# Patient Record
Sex: MALE | Race: WHITE | NOT HISPANIC OR LATINO | Employment: UNEMPLOYED | URBAN - METROPOLITAN AREA
[De-identification: names, ages, dates, MRNs, and addresses within clinical notes are randomized per-mention and may not be internally consistent; named-entity substitution may affect disease eponyms.]

---

## 2018-06-28 ENCOUNTER — HOSPITAL ENCOUNTER (EMERGENCY)
Facility: HOSPITAL | Age: 58
Discharge: HOME | End: 2018-06-28
Attending: EMERGENCY MEDICINE
Payer: MEDICAID

## 2018-06-28 VITALS
RESPIRATION RATE: 18 BRPM | TEMPERATURE: 99 F | DIASTOLIC BLOOD PRESSURE: 60 MMHG | HEIGHT: 71 IN | BODY MASS INDEX: 29.4 KG/M2 | WEIGHT: 210 LBS | OXYGEN SATURATION: 100 % | SYSTOLIC BLOOD PRESSURE: 110 MMHG | HEART RATE: 67 BPM

## 2018-06-28 DIAGNOSIS — M54.50 CHRONIC BILATERAL LOW BACK PAIN WITHOUT SCIATICA: Primary | ICD-10-CM

## 2018-06-28 DIAGNOSIS — F10.20 ALCOHOLISM: ICD-10-CM

## 2018-06-28 DIAGNOSIS — G89.29 CHRONIC BILATERAL LOW BACK PAIN WITHOUT SCIATICA: Primary | ICD-10-CM

## 2018-06-28 PROCEDURE — 99284 EMERGENCY DEPT VISIT MOD MDM: CPT | Mod: 25

## 2018-06-28 PROCEDURE — 63600175 PHARM REV CODE 636 W HCPCS: Performed by: EMERGENCY MEDICINE

## 2018-06-28 PROCEDURE — 96372 THER/PROPH/DIAG INJ SC/IM: CPT

## 2018-06-28 RX ORDER — DEXAMETHASONE SODIUM PHOSPHATE 4 MG/ML
8 INJECTION, SOLUTION INTRA-ARTICULAR; INTRALESIONAL; INTRAMUSCULAR; INTRAVENOUS; SOFT TISSUE
Status: COMPLETED | OUTPATIENT
Start: 2018-06-28 | End: 2018-06-28

## 2018-06-28 RX ORDER — KETOROLAC TROMETHAMINE 30 MG/ML
15 INJECTION, SOLUTION INTRAMUSCULAR; INTRAVENOUS
Status: COMPLETED | OUTPATIENT
Start: 2018-06-28 | End: 2018-06-28

## 2018-06-28 RX ADMIN — DEXAMETHASONE SODIUM PHOSPHATE 8 MG: 4 INJECTION, SOLUTION INTRAMUSCULAR; INTRAVENOUS at 06:06

## 2018-06-28 RX ADMIN — KETOROLAC TROMETHAMINE 15 MG: 30 INJECTION, SOLUTION INTRAMUSCULAR at 06:06

## 2018-06-28 NOTE — ED PROVIDER NOTES
Encounter Date: 6/28/2018       History     Chief Complaint   Patient presents with    Back Pain     chromic back pain      57-year-old male with a past medical history of chronic back pain from degenerative disc disease with multiple bulging disc according to pain management specialist who saw him several months ago and discontinued his chronic Norco for which he took approximately 20 years, depression, schizophrenia, diabetes who presents the emergency department for evaluation of low back pain that is been present for the past many years, but increased since he is now off is Norco for the past month.  The patient drinks daily because he is not able to quell the pain in up to sleep at night.  He does work at [employer].  He denies any unilateral focal weakness or numbness but has any pain with lifting.  He has no fevers or history of infectious process.  Denies any dysuria abdominal pain nausea vomiting diarrhea chest pain or shortness of breath.  The pain is located in the lower back.  He has no sick history of lumbar surgery.  He missed work today admits to drinking last night          Review of patient's allergies indicates:  No Known Allergies  Past Medical History:   Diagnosis Date    Depression     Diabetes mellitus     History of psychiatric care     Hypertension     Psychiatric problem     Schizoaffective disorder      Past Surgical History:   Procedure Laterality Date    APPENDECTOMY       Family History   Problem Relation Age of Onset    Depression Mother     Alcohol abuse Father      Social History   Substance Use Topics    Smoking status: Current Every Day Smoker     Packs/day: 1.00     Years: 15.00    Smokeless tobacco: Never Used    Alcohol use Yes      Comment: drinks fifth of vodka 4 days PTA     Review of Systems   Constitutional: Negative for fever.   HENT: Negative for ear pain, rhinorrhea and sore throat.    Eyes: Negative for pain and visual disturbance.    Respiratory: Negative for cough and shortness of breath.    Cardiovascular: Negative for chest pain.   Gastrointestinal: Negative for abdominal pain, diarrhea, nausea and vomiting.   Genitourinary: Negative for difficulty urinating.   Musculoskeletal: Positive for back pain. Negative for arthralgias.   Skin: Negative for rash.   Neurological: Negative for dizziness, weakness, numbness and headaches.   All other systems reviewed and are negative.      Physical Exam     Initial Vitals [06/28/18 0541]   BP Pulse Resp Temp SpO2   103/60 67 18 98.7 °F (37.1 °C) 95 %      MAP       --         Physical Exam    Nursing note and vitals reviewed.  Constitutional: He appears well-developed and well-nourished. No distress.   HENT:   Head: Normocephalic and atraumatic.   Mouth/Throat: Oropharynx is clear and moist.   Eyes: Conjunctivae and EOM are normal. Pupils are equal, round, and reactive to light.   Neck: Normal range of motion. Neck supple.   Cardiovascular: Normal rate, regular rhythm, normal heart sounds and intact distal pulses. Exam reveals no gallop and no friction rub.    No murmur heard.  Pulmonary/Chest: Breath sounds normal. He has no wheezes. He has no rales.   Abdominal: Soft. Bowel sounds are normal. There is no tenderness. There is no rebound and no guarding.   Musculoskeletal: Normal range of motion.   Able to sit up from a lying position under his own strength.    Mild bilateral lower lumbar tenderness.   Neurological: He is alert and oriented to person, place, and time. He has normal strength. No sensory deficit.   Skin: Skin is warm and dry.   Psychiatric: He has a normal mood and affect.   Smells mildy of etoh           ED Course   Procedures  Labs Reviewed - No data to display       Imaging Results    None          Medical Decision Making:   Patient is a chronic alcoholic with a history of chronic back pain who no longer is able to get opiates and is having chronic exacerbation of his back pain. I do  not feel that opiates are warranted here.  I will give him a dose of toradol and a dose of steroids.  Patient states this has worked for him in the past .  He denies any recent trauma and there are no focal neurologic deficits or significant midline tenderness to warrant emergent imaging at this time.  I do believe the patient is stable for discharge at this time.  I have given him very specific return precautions.  Overall impression is chronic back pain, alcoholism, degenerative disc disease                      Clinical Impression:   The primary encounter diagnosis was Chronic bilateral low back pain without sciatica. A diagnosis of Alcoholism was also pertinent to this visit.                             Bc Sanchez MD  06/28/18 0620

## 2018-06-28 NOTE — ED NOTES
Pt alert and oriented. Pt in NAD. D/c instructions given. Pt discharged to  to wait for ride home.

## 2018-08-10 ENCOUNTER — HOSPITAL ENCOUNTER (EMERGENCY)
Facility: HOSPITAL | Age: 58
Discharge: ANOTHER HOSPITAL | End: 2018-08-10
Attending: EMERGENCY MEDICINE
Payer: MEDICAID

## 2018-08-10 VITALS
SYSTOLIC BLOOD PRESSURE: 185 MMHG | WEIGHT: 210 LBS | TEMPERATURE: 99 F | DIASTOLIC BLOOD PRESSURE: 99 MMHG | RESPIRATION RATE: 16 BRPM | BODY MASS INDEX: 29.4 KG/M2 | HEIGHT: 71 IN | HEART RATE: 97 BPM | OXYGEN SATURATION: 97 %

## 2018-08-10 DIAGNOSIS — S32.475A: Primary | ICD-10-CM
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
## 2018-08-10 DIAGNOSIS — E87.1 HYPONATREMIA: ICD-10-CM

## 2018-08-10 DIAGNOSIS — S72.142A CLOSED DISPLACED INTERTROCHANTERIC FRACTURE OF LEFT FEMUR, INITIAL ENCOUNTER: ICD-10-CM

## 2018-08-10 DIAGNOSIS — F10.929 ALCOHOLIC INTOXICATION WITH COMPLICATION: ICD-10-CM

## 2018-08-10 DIAGNOSIS — S32.511A CLOSED FRACTURE OF RIGHT SUPERIOR PUBIC RAMUS, INITIAL ENCOUNTER: ICD-10-CM

## 2018-08-10 LAB
ALBUMIN SERPL BCP-MCNC: 3.6 G/DL
ALP SERPL-CCNC: 84 U/L
ALT SERPL W/O P-5'-P-CCNC: 16 U/L
AMPHET+METHAMPHET UR QL: NEGATIVE
ANION GAP SERPL CALC-SCNC: 14 MMOL/L
APTT BLDCRRT: 30 SEC
AST SERPL-CCNC: 17 U/L
BARBITURATES UR QL SCN>200 NG/ML: NEGATIVE
BASOPHILS # BLD AUTO: 0.01 K/UL
BASOPHILS NFR BLD: 0.1 %
BENZODIAZ UR QL SCN>200 NG/ML: NORMAL
BILIRUB SERPL-MCNC: 0.6 MG/DL
BILIRUB UR QL STRIP: NEGATIVE
BUN SERPL-MCNC: 20 MG/DL
BZE UR QL SCN: NEGATIVE
CALCIUM SERPL-MCNC: 9.9 MG/DL
CANNABINOIDS UR QL SCN: NEGATIVE
CHLORIDE SERPL-SCNC: 91 MMOL/L
CLARITY UR: CLEAR
CO2 SERPL-SCNC: 21 MMOL/L
COLOR UR: YELLOW
CREAT SERPL-MCNC: 1.2 MG/DL
CREAT UR-MCNC: 59 MG/DL
DIFFERENTIAL METHOD: ABNORMAL
EOSINOPHIL # BLD AUTO: 0 K/UL
EOSINOPHIL NFR BLD: 0.2 %
ERYTHROCYTE [DISTWIDTH] IN BLOOD BY AUTOMATED COUNT: 14.3 %
EST. GFR  (AFRICAN AMERICAN): >60 ML/MIN/1.73 M^2
EST. GFR  (NON AFRICAN AMERICAN): >60 ML/MIN/1.73 M^2
ETHANOL SERPL-MCNC: 54 MG/DL
GLUCOSE SERPL-MCNC: 115 MG/DL
GLUCOSE UR QL STRIP: NEGATIVE
HCT VFR BLD AUTO: 32.6 %
HGB BLD-MCNC: 11.4 G/DL
HGB UR QL STRIP: NEGATIVE
INR PPP: 1
KETONES UR QL STRIP: NEGATIVE
LEUKOCYTE ESTERASE UR QL STRIP: NEGATIVE
LYMPHOCYTES # BLD AUTO: 0.5 K/UL
LYMPHOCYTES NFR BLD: 6.3 %
MCH RBC QN AUTO: 30.5 PG
MCHC RBC AUTO-ENTMCNC: 35 G/DL
MCV RBC AUTO: 87 FL
METHADONE UR QL SCN>300 NG/ML: NEGATIVE
MONOCYTES # BLD AUTO: 0.4 K/UL
MONOCYTES NFR BLD: 5.2 %
NEUTROPHILS # BLD AUTO: 7.4 K/UL
NEUTROPHILS NFR BLD: 88.1 %
NITRITE UR QL STRIP: NEGATIVE
OPIATES UR QL SCN: NORMAL
PCP UR QL SCN>25 NG/ML: NEGATIVE
PH UR STRIP: 7 [PH] (ref 5–8)
PLATELET # BLD AUTO: 144 K/UL
PMV BLD AUTO: 9.4 FL
POTASSIUM SERPL-SCNC: 3.9 MMOL/L
PROT SERPL-MCNC: 6.3 G/DL
PROT UR QL STRIP: NEGATIVE
PROTHROMBIN TIME: 10.5 SEC
RBC # BLD AUTO: 3.74 M/UL
SODIUM SERPL-SCNC: 126 MMOL/L
SP GR UR STRIP: <=1.005 (ref 1–1.03)
TOXICOLOGY INFORMATION: NORMAL
URN SPEC COLLECT METH UR: ABNORMAL
UROBILINOGEN UR STRIP-ACNC: NEGATIVE EU/DL
WBC # BLD AUTO: 8.44 K/UL

## 2018-08-10 PROCEDURE — 63600175 PHARM REV CODE 636 W HCPCS: Performed by: EMERGENCY MEDICINE

## 2018-08-10 PROCEDURE — 85025 COMPLETE CBC W/AUTO DIFF WBC: CPT

## 2018-08-10 PROCEDURE — 80307 DRUG TEST PRSMV CHEM ANLYZR: CPT

## 2018-08-10 PROCEDURE — 96374 THER/PROPH/DIAG INJ IV PUSH: CPT

## 2018-08-10 PROCEDURE — 99285 EMERGENCY DEPT VISIT HI MDM: CPT | Mod: 25

## 2018-08-10 PROCEDURE — 81003 URINALYSIS AUTO W/O SCOPE: CPT | Mod: 59

## 2018-08-10 PROCEDURE — 85730 THROMBOPLASTIN TIME PARTIAL: CPT

## 2018-08-10 PROCEDURE — 96376 TX/PRO/DX INJ SAME DRUG ADON: CPT

## 2018-08-10 PROCEDURE — 96375 TX/PRO/DX INJ NEW DRUG ADDON: CPT

## 2018-08-10 PROCEDURE — 25000003 PHARM REV CODE 250: Performed by: EMERGENCY MEDICINE

## 2018-08-10 PROCEDURE — 85610 PROTHROMBIN TIME: CPT

## 2018-08-10 PROCEDURE — 80320 DRUG SCREEN QUANTALCOHOLS: CPT

## 2018-08-10 PROCEDURE — 80053 COMPREHEN METABOLIC PANEL: CPT

## 2018-08-10 RX ORDER — ONDANSETRON 2 MG/ML
4 INJECTION INTRAMUSCULAR; INTRAVENOUS
Status: COMPLETED | OUTPATIENT
Start: 2018-08-10 | End: 2018-08-10

## 2018-08-10 RX ORDER — FENTANYL CITRATE 50 UG/ML
100 INJECTION, SOLUTION INTRAMUSCULAR; INTRAVENOUS
Status: COMPLETED | OUTPATIENT
Start: 2018-08-10 | End: 2018-08-10

## 2018-08-10 RX ORDER — MORPHINE SULFATE 4 MG/ML
4 INJECTION, SOLUTION INTRAMUSCULAR; INTRAVENOUS
Status: COMPLETED | OUTPATIENT
Start: 2018-08-10 | End: 2018-08-10

## 2018-08-10 RX ORDER — SODIUM CHLORIDE 9 MG/ML
1000 INJECTION, SOLUTION INTRAVENOUS
Status: COMPLETED | OUTPATIENT
Start: 2018-08-10 | End: 2018-08-10

## 2018-08-10 RX ADMIN — ONDANSETRON 4 MG: 2 INJECTION INTRAMUSCULAR; INTRAVENOUS at 02:08

## 2018-08-10 RX ADMIN — MORPHINE SULFATE 4 MG: 4 INJECTION INTRAVENOUS at 06:08

## 2018-08-10 RX ADMIN — ONDANSETRON 4 MG: 2 INJECTION INTRAMUSCULAR; INTRAVENOUS at 06:08

## 2018-08-10 RX ADMIN — FENTANYL CITRATE 100 MCG: 50 INJECTION, SOLUTION INTRAMUSCULAR; INTRAVENOUS at 02:08

## 2018-08-10 RX ADMIN — SODIUM CHLORIDE 1000 ML: 0.9 INJECTION, SOLUTION INTRAVENOUS at 02:08

## 2018-08-10 NOTE — ED PROVIDER NOTES
Encounter Date: 8/10/2018       History     Chief Complaint   Patient presents with    Fall     Patient presents to the ED via [facility] with reports of patient with a fall from a standing position and now having left hip pain with shortening and rotation. EMS states +ETOH.      Patient is a 57-year-old male brought in by EMS after he sustained a ground level fall.  Patient says he gets frequent dizzy spells which caused him to fall.  He says he landed on his left side and now has severe left hip pain worsened by any attempted movement of the left leg.  He denies any head trauma or loss of consciousness.  No nausea or vomiting. No visual changes.  Patient admits to ETOH consumption.          Review of patient's allergies indicates:  No Known Allergies  Past Medical History:   Diagnosis Date    Depression     Diabetes mellitus     History of psychiatric care     Hypertension     Psychiatric problem     Schizoaffective disorder      Past Surgical History:   Procedure Laterality Date    APPENDECTOMY       Family History   Problem Relation Age of Onset    Depression Mother     Alcohol abuse Father      Social History   Substance Use Topics    Smoking status: Current Every Day Smoker     Packs/day: 1.00     Years: 15.00    Smokeless tobacco: Never Used    Alcohol use Yes      Comment: drinks fifth of vodka 4 days PTA     Review of Systems   Respiratory: Negative for shortness of breath.    Cardiovascular: Negative for chest pain.   Gastrointestinal: Negative for abdominal pain, nausea and vomiting.   Musculoskeletal: Positive for back pain and neck pain.        Left hip pain.   Neurological: Positive for dizziness. Negative for syncope.   All other systems reviewed and are negative.      Physical Exam     Initial Vitals [08/10/18 0029]   BP Pulse Resp Temp SpO2   115/75 97 18 97.9 °F (36.6 °C) 100 %      MAP       --         Physical Exam    Nursing note and vitals reviewed.  Constitutional: No  distress.   HENT:   Head: Normocephalic and atraumatic.   Eyes: EOM are normal. Pupils are equal, round, and reactive to light.   Neck: Neck supple.   Mild upper cervical vertebral tenderness.   Cardiovascular: Normal rate, regular rhythm and normal heart sounds.   Pulmonary/Chest: Breath sounds normal.   Abdominal: Soft. There is no tenderness.   Musculoskeletal:   There is tenderness over the greater trochanter of the left hip.  There is left hip pain elicited with internal and external rotation of the left leg.  Remainder of extremity exam is unremarkable.   Neurological: He is alert.   Psychiatric: His behavior is normal.         ED Course   Procedures  Labs Reviewed   CBC W/ AUTO DIFFERENTIAL - Abnormal; Notable for the following:        Result Value    RBC 3.74 (*)     Hemoglobin 11.4 (*)     Hematocrit 32.6 (*)     Platelets 144 (*)     Lymph # 0.5 (*)     Gran% 88.1 (*)     Lymph% 6.3 (*)     All other components within normal limits   COMPREHENSIVE METABOLIC PANEL - Abnormal; Notable for the following:     Sodium 126 (*)     Chloride 91 (*)     CO2 21 (*)     Glucose 115 (*)     All other components within normal limits   ALCOHOL,MEDICAL (ETHANOL) - Abnormal; Notable for the following:     Alcohol, Medical, Serum 54 (*)     All other components within normal limits   PROTIME-INR   APTT   URINALYSIS   DRUG SCREEN PANEL, URINE EMERGENCY          Imaging Results          CT Cervical Spine Without Contrast (Final result)  Result time 08/10/18 03:25:52    Final result by Jared Vargas MD (08/10/18 03:25:52)                 Impression:      No acute cervical fracture.      Electronically signed by: Jared Vargas MD  Date:    08/10/2018  Time:    03:25             Narrative:    EXAMINATION:  CT CERVICAL SPINE WITHOUT CONTRAST    CLINICAL HISTORY:  C-spine trauma, NEXUS/CCR negative, low risk;C-spine trauma, NEXUS/CCR positive, low risk;    TECHNIQUE:  Low dose axial images, sagittal and coronal reformations  were performed though the cervical spine.  Contrast was not administered.    COMPARISON:  Cervical spine radiograph, same day.    FINDINGS:    Normal alignment. No prevertebral soft tissue thickening. Vertebral body heights are relatively well maintained.  There are moderate multilevel degenerative changes.  No evidence of severe central canal stenosis or foraminal narrowing.  There are multiple mildly prominent but subcentimeter lymph nodes identified in the right paratracheal region and upper mediastinum.  Lung apices are essentially clear.  There are prominent vascular calcifications noted involving the thoracic aorta and common carotid arteries.                               X-Ray Knee 1 or 2 View Left (Final result)  Result time 08/10/18 02:17:05    Final result by Jared Vargas MD (08/10/18 02:17:05)                 Impression:      Suboptimal exam secondary to patient positioning.  No displaced fracture or dislocation involving the left knee.      Electronically signed by: Jared Vargas MD  Date:    08/10/2018  Time:    02:17             Narrative:    EXAMINATION:  XR KNEE 1 OR 2 VIEW LEFT    CLINICAL HISTORY:  Unspecified fall, initial encounter    TECHNIQUE:  Two views of the left knee.    COMPARISON:  None.    FINDINGS:  Evaluation is significantly limited by patient positioning, particularly on the frontal view.  No displaced fracture is identified.  Alignment appears normal.  There are prominent vascular calcifications identified.  No radiopaque foreign body.                               X-Ray Hip 2 View Left (Final result)  Result time 08/10/18 02:16:57    Final result by Naseem Mckeon MD (08/10/18 02:16:57)                 Impression:      Acute displaced intertrochanteric fracture of the left femur.    Acute nondisplaced fracture of the right superior pubic ramus    Acute nondisplaced fracture involving the superior aspect of the left pubic bone with extension into the medial wall of the left  acetabulum.    Extensive vascular calcifications.      Electronically signed by: Naseem Mckeon MD  Date:    08/10/2018  Time:    02:16             Narrative:    EXAMINATION:  XR HIP 2 VIEW LEFT    CLINICAL HISTORY:  Unspecified fall, initial encounter    TECHNIQUE:  AP view of the pelvis and frog leg lateral view of the left hip were performed.    COMPARISON:  None    FINDINGS:  Pelvis:    There is an acute nondisplaced fracture involving the right superior pubic ramus.  There is also an acute nondisplaced fracture involving the superior aspect of the pubic bone with extension into the medial wall of the left acetabulum.  The remainder of the pelvic ring appears unremarkable.  There is no evidence of widening of the sacroiliac joints.  The pubic symphysis appear unremarkable.  There are extensive vascular calcifications.  The remainder of the soft tissues are unremarkable.    Left hip:    There is an acute displaced intertrochanteric fracture of the left femur.  There is no evidence of a dislocation.  There are extensive vascular calcifications.                               X-Ray Cervical Spine AP And Lateral (Final result)  Result time 08/10/18 02:19:47    Final result by Naseem Mckeon MD (08/10/18 02:19:47)                 Impression:      Markedly limited examination secondary to overlying soft tissue and motion.  Noncontrast CT scan of the cervical spine may be obtained for further evaluation.      Electronically signed by: Naseem Mckeon MD  Date:    08/10/2018  Time:    02:19             Narrative:    EXAMINATION:  XR CERVICAL SPINE AP LATERAL    CLINICAL HISTORY:  Status post fall.    TECHNIQUE:  Attempted AP, lateral and open mouth views of the cervical spine were performed.    COMPARISON:  None.    FINDINGS:  The examination is markedly limited given patient positioning.  The visualized portions of the craniocervical junction is unremarkable.  Assessment of the alignment of the vertebral bodies is  significantly limited given motion limitations and overlying soft tissues.  No prevertebral soft tissue swelling is identified.  No definitive fracture.  The visualized lung apices are within normal limits.                                 Medical Decision Making:   ED Management:  Discussed with Dr. Subramanian, unable to treat at this facility due to pelvic involvement.  Will transfer to [facility].  VSS, pain improved.  Dr. Coronel accepting.                      Clinical Impression:   The primary encounter diagnosis was Closed nondisplaced fracture of medial wall of left acetabulum, initial encounter. Diagnoses of Fall, Closed displaced intertrochanteric fracture of left femur, initial encounter, Closed fracture of right superior pubic ramus, initial encounter, Hyponatremia, and Alcoholic intoxication with complication were also pertinent to this visit.      Disposition:   Disposition: Transferred  Condition: Stable                        Guy J. Lefort, MD  08/10/18 0448

## 2018-08-10 NOTE — ED TRIAGE NOTES
Patient presents to the ED via [facility] with reports of  fall from a standing position. Patient is c/o having left hip pain. Shortening and rotation noted to left leg. Paramedic also reports +ETOH. Patient reports drinking 1 beer and less than a half pint of liquor. Patient is awake and alert.

## 2018-08-10 NOTE — PROVIDER PROGRESS NOTES - EMERGENCY DEPT.
Encounter Date: 8/10/2018    ED Physician Progress Notes       SCRIBE NOTE: I, Rox Franks, am scribing for, and in the presence of,  Dr. Lefort.  Physician Statement: I, Dr. Lefort, personally performed the services described in this documentation as scribed by Rox Franks in my presence, and it is both accurate and complete.     Physician Note:   2:50 AM Case discussed with Dr. Subramanian of Orthopedics, will accept the patient for transfer to [facility]

## 2018-09-24 ENCOUNTER — HOSPITAL ENCOUNTER (EMERGENCY)
Facility: HOSPITAL | Age: 58
Discharge: HOME | End: 2018-09-24
Attending: EMERGENCY MEDICINE
Payer: MEDICAID

## 2018-09-24 VITALS
OXYGEN SATURATION: 98 % | WEIGHT: 200 LBS | RESPIRATION RATE: 16 BRPM | HEART RATE: 76 BPM | TEMPERATURE: 98 F | HEIGHT: 67 IN | SYSTOLIC BLOOD PRESSURE: 148 MMHG | BODY MASS INDEX: 31.39 KG/M2 | DIASTOLIC BLOOD PRESSURE: 69 MMHG

## 2018-09-24 DIAGNOSIS — M54.5 BILATERAL LOW BACK PAIN, UNSPECIFIED CHRONICITY, WITH SCIATICA PRESENCE UNSPECIFIED: ICD-10-CM
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
## 2018-09-24 DIAGNOSIS — E87.1 HYPONATREMIA: ICD-10-CM

## 2018-09-24 LAB
BASOPHILS # BLD AUTO: 0.03 K/UL
BASOPHILS NFR BLD: 0.4 %
BUN SERPL-MCNC: 9 MG/DL (ref 6–30)
CHLORIDE SERPL-SCNC: 93 MMOL/L (ref 95–110)
CREAT SERPL-MCNC: 0.8 MG/DL (ref 0.5–1.4)
DIFFERENTIAL METHOD: ABNORMAL
EOSINOPHIL # BLD AUTO: 0 K/UL
EOSINOPHIL NFR BLD: 0.4 %
ERYTHROCYTE [DISTWIDTH] IN BLOOD BY AUTOMATED COUNT: 14.2 %
ETHANOL SERPL-MCNC: <10 MG/DL
GLUCOSE SERPL-MCNC: 147 MG/DL (ref 70–110)
HCT VFR BLD AUTO: 25.2 %
HCT VFR BLD CALC: 25 %PCV (ref 36–54)
HGB BLD-MCNC: 8.3 G/DL
IMM GRANULOCYTES # BLD AUTO: 0.03 K/UL
IMM GRANULOCYTES NFR BLD AUTO: 0.4 %
LYMPHOCYTES # BLD AUTO: 0.8 K/UL
LYMPHOCYTES NFR BLD: 10.1 %
MCH RBC QN AUTO: 28.3 PG
MCHC RBC AUTO-ENTMCNC: 32.9 G/DL
MCV RBC AUTO: 86 FL
MONOCYTES # BLD AUTO: 0.6 K/UL
MONOCYTES NFR BLD: 7 %
NEUTROPHILS # BLD AUTO: 6.4 K/UL
NEUTROPHILS NFR BLD: 81.7 %
NRBC BLD-RTO: 0 /100 WBC
PLATELET # BLD AUTO: 240 K/UL
PMV BLD AUTO: 8.8 FL
POC IONIZED CALCIUM: 1.26 MMOL/L (ref 1.06–1.42)
POC TCO2 (MEASURED): 25 MMOL/L (ref 23–29)
POTASSIUM BLD-SCNC: 3.6 MMOL/L (ref 3.5–5.1)
RBC # BLD AUTO: 2.93 M/UL
SAMPLE: ABNORMAL
SODIUM BLD-SCNC: 127 MMOL/L (ref 136–145)
WBC # BLD AUTO: 7.85 K/UL

## 2018-09-24 PROCEDURE — 99284 EMERGENCY DEPT VISIT MOD MDM: CPT | Mod: 25

## 2018-09-24 PROCEDURE — 85025 COMPLETE CBC W/AUTO DIFF WBC: CPT

## 2018-09-24 PROCEDURE — 96361 HYDRATE IV INFUSION ADD-ON: CPT

## 2018-09-24 PROCEDURE — 99284 EMERGENCY DEPT VISIT MOD MDM: CPT | Mod: ,,, | Performed by: EMERGENCY MEDICINE

## 2018-09-24 PROCEDURE — 96360 HYDRATION IV INFUSION INIT: CPT

## 2018-09-24 PROCEDURE — 80320 DRUG SCREEN QUANTALCOHOLS: CPT

## 2018-09-24 PROCEDURE — 25000003 PHARM REV CODE 250: Performed by: PHYSICIAN ASSISTANT

## 2018-09-24 PROCEDURE — 63600175 PHARM REV CODE 636 W HCPCS: Performed by: PHYSICIAN ASSISTANT

## 2018-09-24 PROCEDURE — 96372 THER/PROPH/DIAG INJ SC/IM: CPT

## 2018-09-24 RX ORDER — HYDROCODONE BITARTRATE AND ACETAMINOPHEN 5; 325 MG/1; MG/1
1 TABLET ORAL
Status: COMPLETED | OUTPATIENT
Start: 2018-09-24 | End: 2018-09-24

## 2018-09-24 RX ORDER — KETOROLAC TROMETHAMINE 30 MG/ML
10 INJECTION, SOLUTION INTRAMUSCULAR; INTRAVENOUS
Status: COMPLETED | OUTPATIENT
Start: 2018-09-24 | End: 2018-09-24

## 2018-09-24 RX ADMIN — SODIUM CHLORIDE 1000 ML: 0.9 INJECTION, SOLUTION INTRAVENOUS at 04:09

## 2018-09-24 RX ADMIN — KETOROLAC TROMETHAMINE 10 MG: 30 INJECTION, SOLUTION INTRAMUSCULAR at 03:09

## 2018-09-24 RX ADMIN — HYDROCODONE BITARTRATE AND ACETAMINOPHEN 1 TABLET: 5; 325 TABLET ORAL at 04:09

## 2018-09-24 NOTE — DISCHARGE INSTRUCTIONS
Please follow-up with your PCP for management of your lower back pain and low sodium levels.     Please return to the ED should you experience the following symptoms: trouble breathing, confusion, extreme drowsiness, fainting or loss of consciousness, chest pain, loss of bowel or bladder control, or any other concerning symptoms.

## 2018-09-24 NOTE — ED PROVIDER NOTES
"Encounter Date: 9/24/2018       History     Chief Complaint   Patient presents with    Fall     PT fell from standing to sitting position. Pt complaining of  back pain and bilateral leg pain. Pt reports he fell and hit the back of his head.      Patient is a 57-yo with a hx of alcoholism, DM, HTN, psychiatric disorder who presents to the ED for urgent evaluation of a fall. He states that he was at a gas station when he felt a "dizzy spell" come on and subsequently fell backwards from a standing position onto the pavement. He said that he hit his head and "blacked out". He is complaining of severe low back pain with radiation into bilateral LEs. He was recently seen in the [facility] ED for a fall on 8/10 and transferred to [facility] for ORIF of left femur. He also sustained fractures of R superior pubic ramus, L inferior pubic ramus, and L superior pubic ramus all treated nonoperatively. He reports going to inpatient rehab following his surgery and resuming his normal ADLs upon discharge. He denies EtOH consumption or drug use. He denies HA, chest pain, SOB, abdominal pain, dysuria, or N/V. He states he "couldn't find" his home meds this morning.      The history is provided by the patient.     Review of patient's allergies indicates:  No Known Allergies  Past Medical History:   Diagnosis Date    Depression     Diabetes mellitus     History of psychiatric care     Hypertension     Psychiatric problem     Schizoaffective disorder      Past Surgical History:   Procedure Laterality Date    APPENDECTOMY       Family History   Problem Relation Age of Onset    Depression Mother     Alcohol abuse Father      Social History     Tobacco Use    Smoking status: Current Every Day Smoker     Packs/day: 1.00     Years: 15.00     Pack years: 15.00    Smokeless tobacco: Never Used   Substance Use Topics    Alcohol use: Yes     Comment: drinks fifth of vodka 4 days PTA    Drug use: Yes     Review of Systems "   Constitutional: Negative for activity change, appetite change, chills and fever.   HENT: Negative for ear pain and sore throat.    Eyes: Negative for pain.   Respiratory: Negative for cough and shortness of breath.    Cardiovascular: Positive for leg swelling. Negative for chest pain.   Gastrointestinal: Negative for abdominal pain, diarrhea, nausea and vomiting.   Genitourinary: Negative for dysuria.   Musculoskeletal: Positive for back pain. Negative for arthralgias, gait problem, neck pain and neck stiffness.   Skin: Positive for wound.   Neurological: Positive for dizziness and syncope. Negative for weakness, light-headedness, numbness and headaches.   Psychiatric/Behavioral: Negative for dysphoric mood.       Physical Exam     Initial Vitals [09/24/18 1402]   BP Pulse Resp Temp SpO2   (!) 178/96 98 18 98.7 °F (37.1 °C) 100 %      MAP       --         Physical Exam    Nursing note and vitals reviewed.  Constitutional: He appears well-developed and well-nourished. He is not diaphoretic. No distress.   HENT:   Head: Normocephalic and atraumatic.   Right Ear: External ear normal.   Left Ear: External ear normal.   Nose: Nose normal.   Mouth/Throat: Oropharynx is clear and moist.   Eyes: Conjunctivae and EOM are normal. Pupils are equal, round, and reactive to light.   Neck: Normal range of motion. Neck supple.   No cervical midline tenderness.   Cardiovascular: Normal rate, regular rhythm, normal heart sounds and intact distal pulses.   Pulmonary/Chest: Breath sounds normal. No respiratory distress.   Abdominal: Soft. Bowel sounds are normal.   Musculoskeletal: He exhibits tenderness.        Lumbar back: He exhibits tenderness and bony tenderness.   2+ pitting edema noted to bilateral LE's up to knees.    Neurological: He is alert.   Skin: Skin is warm and dry.   Skin abrasion noted to left elbow.   Psychiatric:   Patient appears agitated, unable to sit still in bed. He is cooperative, but easily distracted.  Exhibits slurred speech.         ED Course   Procedures  Labs Reviewed   CBC W/ AUTO DIFFERENTIAL - Abnormal; Notable for the following components:       Result Value    RBC 2.93 (*)     Hemoglobin 8.3 (*)     Hematocrit 25.2 (*)     MPV 8.8 (*)     Lymph # 0.8 (*)     Gran% 81.7 (*)     Lymph% 10.1 (*)     All other components within normal limits   ISTAT PROCEDURE - Abnormal; Notable for the following components:    POC Glucose 147 (*)     POC Sodium 127 (*)     POC Chloride 93 (*)     POC Hematocrit 25 (*)     All other components within normal limits   ALCOHOL,MEDICAL (ETHANOL)    Narrative:     RED TUBE   ISTAT CHEM8          Imaging Results          CT Head Without Contrast (Final result)  Result time 09/24/18 17:46:16    Final result by Drew Hernandez MD (09/24/18 17:46:16)                 Impression:      1. Bifrontal encephalomalacia suggesting sequela of previous infarct or insult.  No convincing acute hemorrhage or acute infarct.  2. Sinus disease.      Electronically signed by: Drew Hernandez MD  Date:    09/24/2018  Time:    17:46             Narrative:    EXAMINATION:  CT HEAD WITHOUT CONTRAST    CLINICAL HISTORY:  fall with LOC;    TECHNIQUE:  Low dose axial images were obtained through the head.  Coronal and sagittal reformations were also performed. Contrast was not administered.    COMPARISON:  None.    FINDINGS:  There is generalized cerebral volume loss.  There is hypoattenuation in a periventricular fashion, likely sequela of chronic microvascular ischemic change.There is bifrontal encephalomalacia, suggesting sequela of previous infarct or insult.  There is no evidence of acute major vascular territory infarct, hemorrhage, or mass.  There is no hydrocephalus.  There are no abnormal extra-axial fluid collections.  There is mucous membrane thickening of the maxillary sinuses and minimal mucous membrane thickening of the ethmoid sinuses, otherwise the visualized paranasal sinuses and mastoid  air cells are clear, and there is no evidence of calvarial fracture.  The visualized soft tissues are unremarkable.                               X-Ray Lumbar Spine Ap And Lateral (Final result)  Result time 09/24/18 17:30:44    Final result by Drew Hernandez MD (09/24/18 17:30:44)                 Impression:      1. Compression deformities of the spine as described, primarily involving L3 and L5.  The L5 compression deformity appears to have been present on the previous exam although only evaluated in the AP view.  Overall, correlation with focal tenderness recommended as no previous examinations are available for full comparison.  Correlation advised.  Additional findings above.      Electronically signed by: Drew Hernandez MD  Date:    09/24/2018  Time:    17:30             Narrative:    EXAMINATION:  XR LUMBAR SPINE AP AND LATERAL    CLINICAL HISTORY:  low back pain s/p fall;    TECHNIQUE:  AP, lateral and spot images were performed of the lumbar spine.    COMPARISON:  Hip radiograph 08/10/2018    FINDINGS:  Three views.    Lateral imaging demonstrates adequate alignment of the lumbar spine.  There is height loss of L5, appears to have been present on the hip radiograph although only frontal view was obtained.  There is central height loss involving L3, with less than 50% height loss.  There is superior height loss of T12, less than 50% and possibly also of T11.  There is multilevel facet arthropathy.  The sacral segments are grossly aligned.  There is dextroscoliotic curvature of the spine on AP view.  The sacroiliac joints appear grossly intact.  There is vascular calcification.                               X-Ray Hips Bilateral 2 View Incl AP Pelvis (Final result)  Result time 09/24/18 17:34:12    Final result by Drew Hernandez MD (09/24/18 17:34:12)                 Impression:      1. No convincing acute displaced fracture or dislocation of the hips noting remote injuries and surgical change as  "described above.      Electronically signed by: Drew Hernandez MD  Date:    09/24/2018  Time:    17:34             Narrative:    EXAMINATION:  XR HIPS BILATERAL 2 VIEW INCL AP PELVIS    TECHNIQUE:  AP view of the pelvis and frogleg lateral views of both hips were performed.    COMPARISON:  08/10/2018    FINDINGS:  Four views.    There is been interval starla and screw fixation of left intertrochanteric fracture, there is some nonunion noting heterotopic ossification about the fracture line.  There is remote fracture of the superior pubic ramus on the left.  No dislocation of the hips.  The pubic symphysis is grossly intact noting degenerative changes.  The sacroiliac joints are grossly intact.  No convincing acute displaced fracture or dislocation of the pelvis or hips.  There is questionable remote injury of the right superior pubic ramus.                                 Medical Decision Making:   History:   Old Medical Records: I decided to obtain old medical records.  Old Records Summarized: records from another hospital.       <> Summary of Records: [facility] records from admission on 8/10:  Initially seen at [facility] ED and was found to have the following injuries: "Mildly displaced intertrochanteric left femoral fracture, Minimally displaced left inferior pubic ramus fracture, Nondisplaced proximal left superior pubic ramus fracture, and R superior pubic ramus fracture." He underwent ORIF IM nail trochanteric TFN and tolerated procedure well. Other fx treated nonoperatively. Also treated for hyponatremia during admission. Discharged to [facility]. Had [facility] ortho clinic f/u, unsure if attended.   Initial Assessment:   57WM with PMHx of alcoholism, depression, schizoaffective disorder, and HTN presents to the ED for evaluation following a fall. Patient fell backwards on pavement after experiencing a dizzy spell. Reports LOC. PE reveals afebrile, non-toxic, foul-smelling male exhibiting slurred speech and easy " "distractibility. Hypertensive to 178/96 suspect due to medication noncompliance and pain. VSS. Cooperative on exam, but is unable to sit still secondary to pain and muscle spasms. No visible head trauma. No cervical midline tenderness with full neck ROM. Lumber midline tenderness present with left paraspinal tenderness. Heart RRR. Lungs clear. Abdomen soft and nontender. 2+ pitting edema to bilater LEs. Neurovascularly intact.  Differential Diagnosis:   DDx includes but is not limited to: intoxication, ICH, skull fracture, vertebral fracture, muscle sprain/strain, sciatica.  Clinical Tests:   Radiological Study: Ordered  ED Management:  Will order CT head, lumbar spine, and pelvis. Toradol 10 mg IM given for pain.    Patient states he is unable to straighten his legs for CT secondary to pain. States this is a chronic problem as he has to sleep sitting up. Will change imaging to X-ray hips and lumbar spine. Will give Norco 5 as pt cannot tolerate lying flat without severe pain.   Istat chem 8 reveals Na 127. Will administer IVFs. Hct 25-unchanged from [facility] records on 9/5.                   Attending Attestation:     Physician Attestation Statement for NP/PA:   I have conducted a face to face encounter with this patient in addition to the NP/PA, due to Medical Complexity    Other NP/PA Attestation Additions:     Physical Exam: 5/5 leg muscle strength bilaterally.  2+ edema both legs. No cellulitis.     Medical Decision Making: Patient with long history of frequent falls due to dizzy spells, orthostasis.  He fell backwards today, landing on his back and hitting his head.  CT head negative.  His speech is not slurred, but he speaks quickly and quietly.  Currently feels the same overall as he has the last couple of weeks.  Not intoxicated.  Xrays show L3, L5 compression fractures.  L5 known from previous, patient states "my back has been broken, yeah."  No point tenderness along the lumbar spine during repeated exams; I " "doubt the L3 fracture is acute.  He sits and stands without grimacing.  Patient walked with walker for me and the RN; he states "this is how I have been walking."  Na 127, Hgb 8.2.  He notes history of both hyponatremia and anemia. Both need close monitoring as outpatient but no acute intervention.  Will d/c with close pcp f/u, return for any issues.                      Clinical Impression:   The primary encounter diagnosis was Fall. Diagnoses of Hyponatremia and Bilateral low back pain, unspecified chronicity, with sciatica presence unspecified were also pertinent to this visit.                             Mansoor Whiting MD  09/26/18 0916    "

## 2018-09-24 NOTE — ED TRIAGE NOTES
Pt fell outside gas station. Recent femur fracture a month ago.  Pt uses a walker, does report hitting the back of his head.  No cervical tenderness.  Only slight redness noted to the back of the head.  Pt has several fractures in his ribs and other areas.  States this is due to low blood pressure.

## 2018-09-25 NOTE — ED NOTES
Patient's orthostatics  Lying /71  SPO2 99%  Sitting /66  SPO2 98%  Standing /68  SPO2 98%  Mild dizziness which didn't change from lying to standing.  Orthostatic vitals negative.

## 2018-09-26 NOTE — PROVIDER PROGRESS NOTES - EMERGENCY DEPT.
"Attempted to call patient at his given mobile phone number to check on him; no voicemail available, went straight to "person not available" message without ringing.    "